# Patient Record
Sex: MALE | Race: WHITE | NOT HISPANIC OR LATINO
[De-identification: names, ages, dates, MRNs, and addresses within clinical notes are randomized per-mention and may not be internally consistent; named-entity substitution may affect disease eponyms.]

---

## 2019-07-02 ENCOUNTER — TRANSCRIPTION ENCOUNTER (OUTPATIENT)
Age: 24
End: 2019-07-02

## 2019-08-01 ENCOUNTER — EMERGENCY (EMERGENCY)
Facility: HOSPITAL | Age: 24
LOS: 1 days | Discharge: HOME | End: 2019-08-01
Admitting: EMERGENCY MEDICINE
Payer: COMMERCIAL

## 2019-08-01 VITALS
HEART RATE: 58 BPM | DIASTOLIC BLOOD PRESSURE: 78 MMHG | RESPIRATION RATE: 18 BRPM | SYSTOLIC BLOOD PRESSURE: 128 MMHG | WEIGHT: 169.98 LBS | OXYGEN SATURATION: 98 % | TEMPERATURE: 98 F | HEIGHT: 74 IN

## 2019-08-01 VITALS
RESPIRATION RATE: 17 BRPM | HEART RATE: 59 BPM | DIASTOLIC BLOOD PRESSURE: 79 MMHG | OXYGEN SATURATION: 98 % | SYSTOLIC BLOOD PRESSURE: 115 MMHG

## 2019-08-01 PROBLEM — Z00.00 ENCOUNTER FOR PREVENTIVE HEALTH EXAMINATION: Status: ACTIVE | Noted: 2019-08-01

## 2019-08-01 LAB
ALBUMIN SERPL ELPH-MCNC: 4.2 G/DL — SIGNIFICANT CHANGE UP (ref 3.4–5)
ALP SERPL-CCNC: 69 U/L — SIGNIFICANT CHANGE UP (ref 40–120)
ALT FLD-CCNC: 18 U/L — SIGNIFICANT CHANGE UP (ref 12–42)
ANION GAP SERPL CALC-SCNC: 7 MMOL/L — LOW (ref 9–16)
AST SERPL-CCNC: 20 U/L — SIGNIFICANT CHANGE UP (ref 15–37)
BILIRUB SERPL-MCNC: 0.5 MG/DL — SIGNIFICANT CHANGE UP (ref 0.2–1.2)
BUN SERPL-MCNC: 14 MG/DL — SIGNIFICANT CHANGE UP (ref 7–23)
CALCIUM SERPL-MCNC: 9.1 MG/DL — SIGNIFICANT CHANGE UP (ref 8.5–10.5)
CHLORIDE SERPL-SCNC: 109 MMOL/L — HIGH (ref 96–108)
CO2 SERPL-SCNC: 26 MMOL/L — SIGNIFICANT CHANGE UP (ref 22–31)
CREAT SERPL-MCNC: 1.02 MG/DL — SIGNIFICANT CHANGE UP (ref 0.5–1.3)
D DIMER BLD IA.RAPID-MCNC: <187 NG/ML DDU — SIGNIFICANT CHANGE UP
GLUCOSE SERPL-MCNC: 79 MG/DL — SIGNIFICANT CHANGE UP (ref 70–99)
HCT VFR BLD CALC: 43.1 % — SIGNIFICANT CHANGE UP (ref 39–50)
HGB BLD-MCNC: 15.1 G/DL — SIGNIFICANT CHANGE UP (ref 13–17)
MAGNESIUM SERPL-MCNC: 2.1 MG/DL — SIGNIFICANT CHANGE UP (ref 1.6–2.6)
MCHC RBC-ENTMCNC: 29.5 PG — SIGNIFICANT CHANGE UP (ref 27–34)
MCHC RBC-ENTMCNC: 35 G/DL — SIGNIFICANT CHANGE UP (ref 32–36)
MCV RBC AUTO: 84.2 FL — SIGNIFICANT CHANGE UP (ref 80–100)
PLATELET # BLD AUTO: 218 K/UL — SIGNIFICANT CHANGE UP (ref 150–400)
POTASSIUM SERPL-MCNC: 4.3 MMOL/L — SIGNIFICANT CHANGE UP (ref 3.5–5.3)
POTASSIUM SERPL-SCNC: 4.3 MMOL/L — SIGNIFICANT CHANGE UP (ref 3.5–5.3)
PROT SERPL-MCNC: 7.4 G/DL — SIGNIFICANT CHANGE UP (ref 6.4–8.2)
RBC # BLD: 5.12 M/UL — SIGNIFICANT CHANGE UP (ref 4.2–5.8)
RBC # FLD: 12.4 % — SIGNIFICANT CHANGE UP (ref 10.3–14.5)
SODIUM SERPL-SCNC: 142 MMOL/L — SIGNIFICANT CHANGE UP (ref 132–145)
TROPONIN I SERPL-MCNC: <0.017 NG/ML — LOW (ref 0.02–0.06)
TSH SERPL-MCNC: 2.17 UIU/ML — SIGNIFICANT CHANGE UP (ref 0.36–3.74)
WBC # BLD: 9.1 K/UL — SIGNIFICANT CHANGE UP (ref 3.8–10.5)
WBC # FLD AUTO: 9.1 K/UL — SIGNIFICANT CHANGE UP (ref 3.8–10.5)

## 2019-08-01 PROCEDURE — 93010 ELECTROCARDIOGRAM REPORT: CPT

## 2019-08-01 PROCEDURE — 99284 EMERGENCY DEPT VISIT MOD MDM: CPT

## 2019-08-01 RX ORDER — SODIUM CHLORIDE 9 MG/ML
1000 INJECTION INTRAMUSCULAR; INTRAVENOUS; SUBCUTANEOUS ONCE
Refills: 0 | Status: COMPLETED | OUTPATIENT
Start: 2019-08-01 | End: 2019-08-01

## 2019-08-01 RX ADMIN — SODIUM CHLORIDE 1000 MILLILITER(S): 9 INJECTION INTRAMUSCULAR; INTRAVENOUS; SUBCUTANEOUS at 12:00

## 2019-08-01 NOTE — ED ADULT NURSE NOTE - NSIMPLEMENTINTERV_GEN_ALL_ED
Implemented All Universal Safety Interventions:  [place] to call system. Call bell, personal items and telephone within reach. Instruct patient to call for assistance. Room bathroom lighting operational. Non-slip footwear when patient is off stretcher. Physically safe environment: no spills, clutter or unnecessary equipment. Stretcher in lowest position, wheels locked, appropriate side rails in place.

## 2019-08-01 NOTE — ED PROVIDER NOTE - PHYSICAL EXAMINATION
CONSTITUTIONAL: Well-developed; well-nourished; in no acute distress.  SKIN: Skin is warm and dry, no acute rash.  HEAD: Normocephalic; atraumatic.  EYES: PERRL, EOM intact; conjunctiva and sclera clear.  ENT: No nasal discharge; airway clear.  no tonsillar swelling or exudates, uvula midline, airway patent  NECK: Supple; non tender.  CARD: S1, S2 normal; no murmurs, gallops, or rubs. Regular rate and rhythm.  RESP: No wheezes, rales or rhonchi.  ABD: Normal bowel sounds; soft; non-distended; non-tender  EXT: Normal ROM. MAEx4.  NEURO: Alert, oriented. Grossly unremarkable.  PSYCH: Cooperative, appropriate. CONSTITUTIONAL: Well-developed; well-nourished; in no acute distress.  SKIN: Skin is warm and dry, no acute rash.  HEAD: Normocephalic; atraumatic.  EYES: PERRL, EOM intact; conjunctiva and sclera clear.  ENT: No nasal discharge; airway clear.  no tonsillar swelling or exudates, uvula midline, airway patent  NECK: Supple; non tender.  CARD: S1, S2 normal; no murmurs, gallops, or rubs. Regular rate and rhythm.  RESP: No wheezes, rales or rhonchi.  ABD:  soft; non-distended; non-tender  EXT: Normal ROM. MAEx4.  NEURO: Alert, oriented. Grossly unremarkable.  PSYCH: Cooperative, appropriate.

## 2019-08-01 NOTE — CONSULT NOTE ADULT - SUBJECTIVE AND OBJECTIVE BOX
[facility] Consultation    CHIEF COMPLAINT: CP    HISTORY OF PRESENT ILLNESS: 22 y/o male with chest discomfort. The discomfort was mid-sternal and present for a few days. Symptoms were intermittent but became persistent which prompted this eval.     PAST MEDICAL & SURGICAL HISTORY:  unremarkable    Allergies No Known Allergies    MEDICATIONS:  none     FAMILY HISTORY:    SOCIAL HISTORY:  no EtOH, tobacco or drug use    REVIEW OF SYSTEMS:  CONSTITUTIONAL: No fever, weight loss, or fatigue  EYES: No eye pain, visual disturbances, or discharge  ENMT:  No difficulty hearing, tinnitus, vertigo; No sinus or throat pain  NECK: No pain or stiffness  BREASTS: No pain, masses, or nipple discharge  RESPIRATORY: No cough, wheezing, chills or hemoptysis; No Shortness of Breath  CARDIOVASCULAR: No chest pain, palpitations, dizziness, or leg swelling  GASTROINTESTINAL: No abdominal or epigastric pain. No nausea, vomiting, or hematemesis; No diarrhea or constipation. No melena or hematochezia.  GENITOURINARY: No dysuria, frequency, hematuria, or incontinence  NEUROLOGICAL: No headaches, memory loss, loss of strength, numbness, or tremors  SKIN: No itching, burning, rashes, or lesions   LYMPH Nodes: No enlarged glands  ENDOCRINE: No heat or cold intolerance; No hair loss  MUSCULOSKELETAL: No joint pain or swelling; No muscle, back, or extremity pain  PSYCHIATRIC: No depression, anxiety, mood swings, or difficulty sleeping  HEME/LYMPH: No easy bruising, or bleeding gums  ALLERY AND IMMUNOLOGIC: No hives or eczema	      PHYSICAL EXAM:  T(C): 36.8 (08-01-19 @ 11:11), Max: 36.8 (08-01-19 @ 11:11)  HR: 58 (08-01-19 @ 11:11) (58 - 58)  BP: 128/78 (08-01-19 @ 11:11) (128/78 - 128/78)  RR: 18 (08-01-19 @ 11:11) (18 - 18)  SpO2: 98% (08-01-19 @ 11:11) (98% - 98%)      Appearance: young man NAD  HEENT:   Normal oral mucosa, PERRL, EOMI	  Lymphatic: No lymphadenopathy  Cardiovascular: Normal S1 S2, No JVD, No murmurs, No edema  Respiratory: Lungs clear to auscultation	  Psychiatry: A & O x 3, Mood & affect appropriate  Gastrointestinal:  Soft, Non-tender, + BS	  Skin: No rashes, No ecchymoses, No cyanosis	  Neurologic: Non-focal  Extremities: Normal range of motion, No clubbing, cyanosis or edema  Vascular: Peripheral pulses palpable 2+ bilaterally  	    ECG:  	Sinus grant no st-t changes    LABS:	 	  CARDIAC MARKERS:  Troponin I, Serum: <0.017 ng/mL (08-01 @ 12:03)                                  15.1   9.1   )-----------( 218      ( 01 Aug 2019 12:03 )             43.1     08-01    142  |  109<H>  |  14  ----------------------------<  79  4.3   |  26  |  1.02    Ca    9.1      01 Aug 2019 12:03  Mg     2.1     08-01    TPro  7.4  /  Alb  4.2  /  TBili  0.5  /  DBili  x   /  AST  20  /  ALT  18  /  AlkPhos  69  08-01    proBNP:   TSH: Thyroid Stimulating Hormone, Serum: 2.171 uIU/mL (08-01 @ 12:03)      ASSESSMENT/PLAN: 	22 y/o male with chest discomfort  1. patient seen and examined, chart reviewed  2. limited bedside echo done c/w preserved LV systolic function, no valvular abnormalities  3. NSAIDs prn  4. will see in the office for a visit    I spent 45 min in care of this patient

## 2019-08-01 NOTE — SBIRT NOTE ADULT - NSSBIRTBRIEFINTDET_GEN_A_CORE
Provided SBIRT services: Full screen positive. Brief Intervention Performed. Screening results were reviewed with the patient and patient was provided information about healthy guidelines and potential negative consequences associated with level of risk. Motivation and readiness to reduce or stop use was discussed and goals and activities to make changes were suggested/offered.  Audit Score:8  DAST Score:2  Duration = 15 Minutes

## 2019-08-01 NOTE — ED PROVIDER NOTE - CLINICAL SUMMARY MEDICAL DECISION MAKING FREE TEXT BOX
pleuritic chest pain, young athletic male otherwise healthy, seen and eval by cards, unremarkable echo at bedside done by cards, ekg unremarkable, labs including trop/dimer neg. tsh normal. appreciated cards recommendations, f/u outpatient cards, return precautions discussed, will dc.

## 2019-08-01 NOTE — ED ADULT NURSE NOTE - CHPI ED NUR SYMPTOMS NEG
no nausea/no vomiting/no congestion/no fever/no dizziness/no syncope/no chills/no diaphoresis/no back pain/no shortness of breath

## 2019-08-01 NOTE — ED ADULT TRIAGE NOTE - CHIEF COMPLAINT QUOTE
c/o constant midsternal chest discomfort since yesterday afternoon. reports waking up in this morning with left sided neck pain and lightheadedness. denies PMHX. sent from  for further evaluation. as per paperwork, EKG and CXR WNL.

## 2019-08-01 NOTE — ED PROVIDER NOTE - CARE PROVIDER_API CALL
Ankur Amos)  Cardiovascular Disease  [address] [facility] [address] [place] [address]  Phone: [phone]  Fax: [phone]  Follow Up Time:

## 2019-08-01 NOTE — ED PROVIDER NOTE - OBJECTIVE STATEMENT
24 y/o male with no significant PMHx, no pertinent cardiac FHx, presents to ED c/o CP since yesterday. Patient reports gradual onset of sternal CP yesterday while sitting at his desk at work, worse with deep breathing. Reports pain worsened today, and became constant in nature. States he took Advil last night without symptomatic relief. Reports he woke up this morning with left-sided neck pain, describes as soreness that is worse with movement, lightheadedness/dizziness, and generalized weakness. Patient went to [facility], where he was told he had normal CXR and EKG, and was sent here for further evaluation. Patient currently reports 5/10 CP that is improved from before. 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 22 y/o male with no significant PMHx, no pertinent cardiac FHx, presents to ED c/o CP since yesterday. Patient reports gradual onset of sternal CP yesterday while sitting at his desk at work, worse with deep breathing. Reports pain worsened today, and became constant in nature. States he took Advil last night without symptomatic relief. Reports he woke up this morning with left-sided neck pain, describes as soreness that is worse with movement, lightheadedness/dizziness, and generalized weakness. Patient went to [facility], where he was told he had normal CXR and EKG, and was sent here for further evaluation. Patient currently reports 5/10 CP that is improved from before. Denies any fever, nausea, vomiting, unusual strenuous activity, tobacco use, recent travel or long car rides, or similar pain in the past. 24 y/o male with no significant PMHx, no pertinent cardiac FHx, presents to ED c/o CP since yesterday. Patient reports gradual onset of sternal CP yesterday while sitting at his desk at work, worse with deep breathing. Reports pain worsened today, and became constant in nature. States he took Advil last night without symptomatic relief. Reports he woke up this morning with left-sided neck pain, describes as soreness that is worse with movement, lightheadedness and generalized weakness. Patient went to [facility], where he was told he had normal CXR and EKG, and was sent here for further evaluation. Patient currently reports 5/10 CP that is improved from before. Denies any fever, nausea, vomiting. no tobacco use . no recent travel or long car rides. similar pain in the past. +cocaine  use at times, last use 2 weeks ago. no sig family hx for cardiac disease. no exertional symptoms. patient is active - works out frequently.

## 2019-08-01 NOTE — ED PROVIDER NOTE - NSFOLLOWUPINSTRUCTIONS_ED_ALL_ED_FT
Please follow up with cardiologist - you were provided with an appointment    Take Ibuprofen 600mg every 6-8 hours as needed for pain, take with food, and in addition you may take Tylenol 500 mg every 6-8 hours as needed for pain    Return to the Emergency Department for any concerning or worsening symptoms including worsening  chest pain, shortness of breath, vomiting, loss of consciousness or any concerns.

## 2019-08-01 NOTE — ED ADULT NURSE NOTE - OBJECTIVE STATEMENT
22 y/o male c/o non radiating midsternal chest discomfort yesterday which worsens upon inhalation-  pt as sent by urgent care for follow up- arrives A+Ox3 with grandparents, comfortable appearing- vitals stable, IV placed, blood sent and safety precautions in place

## 2019-08-06 DIAGNOSIS — M54.2 CERVICALGIA: ICD-10-CM

## 2019-08-06 DIAGNOSIS — R07.89 OTHER CHEST PAIN: ICD-10-CM

## 2019-08-08 ENCOUNTER — APPOINTMENT (OUTPATIENT)
Dept: HEART AND VASCULAR | Facility: CLINIC | Age: 24
End: 2019-08-08
Payer: COMMERCIAL

## 2019-08-08 VITALS
DIASTOLIC BLOOD PRESSURE: 71 MMHG | OXYGEN SATURATION: 96 % | HEART RATE: 76 BPM | HEIGHT: 74 IN | BODY MASS INDEX: 21.05 KG/M2 | WEIGHT: 164 LBS | SYSTOLIC BLOOD PRESSURE: 105 MMHG

## 2019-08-08 DIAGNOSIS — Z78.9 OTHER SPECIFIED HEALTH STATUS: ICD-10-CM

## 2019-08-08 PROCEDURE — 99214 OFFICE O/P EST MOD 30 MIN: CPT

## 2019-08-08 NOTE — REASON FOR VISIT
[Chest Pain] : chest pain [Follow-Up - Clinic] : a clinic follow-up of [FreeTextEntry1] : 23 year old man presents secondary to chest discomfort. No prior cardiac testing. The discomfort was pressure like and midsternal. This is often noted with activity. Symptoms always improve at rest. Discomfort radiated to neck and back. Symptoms noted with dyspnea. Overall improving. \par

## 2019-08-08 NOTE — PHYSICAL EXAM
[General Appearance - Well Developed] : well developed [Normal Appearance] : normal appearance [Well Groomed] : well groomed [General Appearance - Well Nourished] : well nourished [General Appearance - In No Acute Distress] : no acute distress [No Deformities] : no deformities [Normal Conjunctiva] : the conjunctiva exhibited no abnormalities [Eyelids - No Xanthelasma] : the eyelids demonstrated no xanthelasmas [Normal Oral Mucosa] : normal oral mucosa [No Oral Pallor] : no oral pallor [No Oral Cyanosis] : no oral cyanosis [Normal Jugular Venous A Waves Present] : normal jugular venous A waves present [Normal Jugular Venous V Waves Present] : normal jugular venous V waves present [No Jugular Venous Walker A Waves] : no jugular venous walker A waves [Respiration, Rhythm And Depth] : normal respiratory rhythm and effort [Exaggerated Use Of Accessory Muscles For Inspiration] : no accessory muscle use [Auscultation Breath Sounds / Voice Sounds] : lungs were clear to auscultation bilaterally [Heart Rate And Rhythm] : heart rate and rhythm were normal [Heart Sounds] : normal S1 and S2 [Abdomen Soft] : soft [Murmurs] : no murmurs present [Abdomen Tenderness] : non-tender [Abnormal Walk] : normal gait [Abdomen Mass (___ Cm)] : no abdominal mass palpated [Gait - Sufficient For Exercise Testing] : the gait was sufficient for exercise testing [Cyanosis, Localized] : no localized cyanosis [Nail Clubbing] : no clubbing of the fingernails [Petechial Hemorrhages (___cm)] : no petechial hemorrhages [Skin Color & Pigmentation] : normal skin color and pigmentation [] : no rash [No Venous Stasis] : no venous stasis [Skin Lesions] : no skin lesions [No Xanthoma] : no  xanthoma was observed [No Skin Ulcers] : no skin ulcer [Affect] : the affect was normal [Oriented To Time, Place, And Person] : oriented to person, place, and time [No Anxiety] : not feeling anxious [Mood] : the mood was normal

## 2019-08-12 ENCOUNTER — APPOINTMENT (OUTPATIENT)
Dept: HEART AND VASCULAR | Facility: CLINIC | Age: 24
End: 2019-08-12
Payer: COMMERCIAL

## 2019-08-12 DIAGNOSIS — R00.1 BRADYCARDIA, UNSPECIFIED: ICD-10-CM

## 2019-08-12 DIAGNOSIS — R06.02 SHORTNESS OF BREATH: ICD-10-CM

## 2019-08-12 DIAGNOSIS — R07.9 CHEST PAIN, UNSPECIFIED: ICD-10-CM

## 2019-08-12 PROCEDURE — 93351 STRESS TTE COMPLETE: CPT
